# Patient Record
Sex: FEMALE | Race: OTHER
[De-identification: names, ages, dates, MRNs, and addresses within clinical notes are randomized per-mention and may not be internally consistent; named-entity substitution may affect disease eponyms.]

---

## 2019-06-08 ENCOUNTER — HOSPITAL ENCOUNTER (EMERGENCY)
Dept: HOSPITAL 54 - ER | Age: 54
Discharge: HOME | End: 2019-06-08
Payer: COMMERCIAL

## 2019-06-08 VITALS — HEIGHT: 63 IN | WEIGHT: 175 LBS | BODY MASS INDEX: 31.01 KG/M2

## 2019-06-08 VITALS — SYSTOLIC BLOOD PRESSURE: 130 MMHG | DIASTOLIC BLOOD PRESSURE: 75 MMHG

## 2019-06-08 DIAGNOSIS — I10: ICD-10-CM

## 2019-06-08 DIAGNOSIS — R10.13: Primary | ICD-10-CM

## 2019-06-08 DIAGNOSIS — Z90.49: ICD-10-CM

## 2019-06-08 DIAGNOSIS — R42: ICD-10-CM

## 2019-06-08 DIAGNOSIS — K80.20: ICD-10-CM

## 2019-06-08 LAB
ALBUMIN SERPL BCP-MCNC: 3.9 G/DL (ref 3.4–5)
ALP SERPL-CCNC: 56 U/L (ref 46–116)
ALT SERPL W P-5'-P-CCNC: 49 U/L (ref 12–78)
AST SERPL W P-5'-P-CCNC: 34 U/L (ref 15–37)
BASOPHILS # BLD AUTO: 0.1 /CMM (ref 0–0.2)
BASOPHILS NFR BLD AUTO: 0.6 % (ref 0–2)
BILIRUB DIRECT SERPL-MCNC: 0.1 MG/DL (ref 0–0.2)
BILIRUB SERPL-MCNC: 0.4 MG/DL (ref 0.2–1)
BUN SERPL-MCNC: 32 MG/DL (ref 7–18)
CALCIUM SERPL-MCNC: 9.1 MG/DL (ref 8.5–10.1)
CHLORIDE SERPL-SCNC: 104 MMOL/L (ref 98–107)
CO2 SERPL-SCNC: 27 MMOL/L (ref 21–32)
CREAT SERPL-MCNC: 0.8 MG/DL (ref 0.6–1.3)
EOSINOPHIL NFR BLD AUTO: 0.3 % (ref 0–6)
GLUCOSE SERPL-MCNC: 115 MG/DL (ref 74–106)
HCT VFR BLD AUTO: 44 % (ref 33–45)
HGB BLD-MCNC: 14.6 G/DL (ref 11.5–14.8)
LIPASE SERPL-CCNC: 221 U/L (ref 73–393)
LYMPHOCYTES NFR BLD AUTO: 1.9 /CMM (ref 0.8–4.8)
LYMPHOCYTES NFR BLD AUTO: 18.9 % (ref 20–44)
LYMPHOCYTES NFR BLD MANUAL: 20 % (ref 16–48)
MCHC RBC AUTO-ENTMCNC: 34 G/DL (ref 31–36)
MCV RBC AUTO: 90 FL (ref 82–100)
MONOCYTES NFR BLD AUTO: 0.5 /CMM (ref 0.1–1.3)
MONOCYTES NFR BLD AUTO: 5.1 % (ref 2–12)
MONOCYTES NFR BLD MANUAL: 6 % (ref 0–11)
NEUTROPHILS # BLD AUTO: 7.4 /CMM (ref 1.8–8.9)
NEUTROPHILS NFR BLD AUTO: 75.1 % (ref 43–81)
NEUTS SEG NFR BLD MANUAL: 74 % (ref 42–76)
PLATELET # BLD AUTO: 207 /CMM (ref 150–450)
POTASSIUM SERPL-SCNC: 3.6 MMOL/L (ref 3.5–5.1)
PROT SERPL-MCNC: 7.6 G/DL (ref 6.4–8.2)
RBC # BLD AUTO: 4.83 MIL/UL (ref 4–5.2)
SODIUM SERPL-SCNC: 141 MMOL/L (ref 136–145)
WBC NRBC COR # BLD AUTO: 9.8 K/UL (ref 4.3–11)

## 2019-06-08 PROCEDURE — 80048 BASIC METABOLIC PNL TOTAL CA: CPT

## 2019-06-08 PROCEDURE — 82962 GLUCOSE BLOOD TEST: CPT

## 2019-06-08 PROCEDURE — 83690 ASSAY OF LIPASE: CPT

## 2019-06-08 PROCEDURE — 71045 X-RAY EXAM CHEST 1 VIEW: CPT

## 2019-06-08 PROCEDURE — 85025 COMPLETE CBC W/AUTO DIFF WBC: CPT

## 2019-06-08 PROCEDURE — 96361 HYDRATE IV INFUSION ADD-ON: CPT

## 2019-06-08 PROCEDURE — 93005 ELECTROCARDIOGRAM TRACING: CPT

## 2019-06-08 PROCEDURE — 99284 EMERGENCY DEPT VISIT MOD MDM: CPT

## 2019-06-08 PROCEDURE — 76705 ECHO EXAM OF ABDOMEN: CPT

## 2019-06-08 PROCEDURE — 36415 COLL VENOUS BLD VENIPUNCTURE: CPT

## 2019-06-08 PROCEDURE — 84484 ASSAY OF TROPONIN QUANT: CPT

## 2019-06-08 PROCEDURE — 96374 THER/PROPH/DIAG INJ IV PUSH: CPT

## 2019-06-08 PROCEDURE — 80076 HEPATIC FUNCTION PANEL: CPT

## 2019-08-15 ENCOUNTER — HOSPITAL ENCOUNTER (OUTPATIENT)
Dept: HOSPITAL 91 - SDS | Age: 54
Discharge: HOME | End: 2019-08-15
Payer: COMMERCIAL

## 2019-08-15 ENCOUNTER — HOSPITAL ENCOUNTER (OUTPATIENT)
Dept: HOSPITAL 10 - SDS | Age: 54
Discharge: HOME | End: 2019-08-15
Attending: SURGERY
Payer: COMMERCIAL

## 2019-08-15 VITALS — HEART RATE: 56 BPM | RESPIRATION RATE: 16 BRPM | DIASTOLIC BLOOD PRESSURE: 74 MMHG | SYSTOLIC BLOOD PRESSURE: 131 MMHG

## 2019-08-15 VITALS — RESPIRATION RATE: 19 BRPM | DIASTOLIC BLOOD PRESSURE: 76 MMHG | SYSTOLIC BLOOD PRESSURE: 153 MMHG | HEART RATE: 80 BPM

## 2019-08-15 VITALS — HEART RATE: 62 BPM | RESPIRATION RATE: 16 BRPM | DIASTOLIC BLOOD PRESSURE: 75 MMHG | SYSTOLIC BLOOD PRESSURE: 135 MMHG

## 2019-08-15 VITALS — SYSTOLIC BLOOD PRESSURE: 140 MMHG | HEART RATE: 54 BPM | DIASTOLIC BLOOD PRESSURE: 76 MMHG | RESPIRATION RATE: 15 BRPM

## 2019-08-15 VITALS
BODY MASS INDEX: 28.71 KG/M2 | HEIGHT: 63 IN | WEIGHT: 162.04 LBS | WEIGHT: 162.04 LBS | HEIGHT: 63 IN | BODY MASS INDEX: 28.71 KG/M2

## 2019-08-15 VITALS — RESPIRATION RATE: 13 BRPM | DIASTOLIC BLOOD PRESSURE: 74 MMHG | SYSTOLIC BLOOD PRESSURE: 143 MMHG | HEART RATE: 58 BPM

## 2019-08-15 VITALS — SYSTOLIC BLOOD PRESSURE: 144 MMHG | DIASTOLIC BLOOD PRESSURE: 74 MMHG | RESPIRATION RATE: 15 BRPM | HEART RATE: 60 BPM

## 2019-08-15 VITALS — SYSTOLIC BLOOD PRESSURE: 163 MMHG | DIASTOLIC BLOOD PRESSURE: 67 MMHG | HEART RATE: 80 BPM | RESPIRATION RATE: 19 BRPM

## 2019-08-15 VITALS — RESPIRATION RATE: 13 BRPM | SYSTOLIC BLOOD PRESSURE: 139 MMHG | DIASTOLIC BLOOD PRESSURE: 74 MMHG | HEART RATE: 50 BPM

## 2019-08-15 VITALS — RESPIRATION RATE: 13 BRPM | HEART RATE: 53 BPM | SYSTOLIC BLOOD PRESSURE: 134 MMHG | DIASTOLIC BLOOD PRESSURE: 73 MMHG

## 2019-08-15 VITALS — RESPIRATION RATE: 13 BRPM | DIASTOLIC BLOOD PRESSURE: 71 MMHG | SYSTOLIC BLOOD PRESSURE: 132 MMHG | HEART RATE: 53 BPM

## 2019-08-15 VITALS — SYSTOLIC BLOOD PRESSURE: 140 MMHG | DIASTOLIC BLOOD PRESSURE: 78 MMHG | RESPIRATION RATE: 12 BRPM | HEART RATE: 56 BPM

## 2019-08-15 VITALS — HEART RATE: 68 BPM | RESPIRATION RATE: 19 BRPM | SYSTOLIC BLOOD PRESSURE: 148 MMHG | DIASTOLIC BLOOD PRESSURE: 77 MMHG

## 2019-08-15 VITALS — HEART RATE: 54 BPM | DIASTOLIC BLOOD PRESSURE: 80 MMHG | SYSTOLIC BLOOD PRESSURE: 142 MMHG | RESPIRATION RATE: 11 BRPM

## 2019-08-15 VITALS — RESPIRATION RATE: 13 BRPM | SYSTOLIC BLOOD PRESSURE: 131 MMHG | HEART RATE: 50 BPM | DIASTOLIC BLOOD PRESSURE: 69 MMHG

## 2019-08-15 DIAGNOSIS — K80.10: Primary | ICD-10-CM

## 2019-08-15 DIAGNOSIS — I10: ICD-10-CM

## 2019-08-15 PROCEDURE — 88304 TISSUE EXAM BY PATHOLOGIST: CPT

## 2019-08-15 PROCEDURE — 84703 CHORIONIC GONADOTROPIN ASSAY: CPT

## 2019-08-15 PROCEDURE — 47562 LAPAROSCOPIC CHOLECYSTECTOMY: CPT

## 2019-08-15 RX ADMIN — OXYCODONE HYDROCHLORIDE AND ACETAMINOPHEN 1 TAB: 5; 325 TABLET ORAL at 10:40

## 2019-08-15 RX ADMIN — HYDROMORPHONE HYDROCHLORIDE 1 MG: 2 INJECTION INTRAMUSCULAR; INTRAVENOUS; SUBCUTANEOUS at 10:36

## 2019-08-15 RX ADMIN — BUPIVACAINE HYDROCHLORIDE 1 ML: 2.5 INJECTION, SOLUTION EPIDURAL; INFILTRATION; INTRACAUDAL; PERINEURAL at 09:27

## 2019-08-15 RX ADMIN — OXYCODONE HYDROCHLORIDE AND ACETAMINOPHEN 1 TAB: 5; 325 TABLET ORAL at 11:18

## 2019-08-15 NOTE — HPN
Date/Time of Note


Date/Time of Note


DATE: 8/15/19 


TIME: 06:56





Interval H&P Admission Note


Pt. seen H&P reviewed:  No system changes











SYDNEE TORRES MD             Aug 15, 2019 06:56

## 2019-08-15 NOTE — OPR
Date/Time of Note


Date/Time of Note


DATE: 8/15/19 


TIME: 09:51





Operative Report


Procedure Date:  Aug 15, 2019


Preoperative Diagnosis


Gallstones without obstruction


Postoperative Diagnosis


Gallstones without obstruction


Operation/Procedure Performed


Laparoscopic cholecystectomy


Surgeon


Sydnee Torres MD


Assistant


None


Anesthesia Type:  general


Anesthesiologist:  KIANA JIMENEZ MD


Estimated Blood Loss:  minimal


Transfusion


   none


Specimen


Gallbladder


Grafts/Implants


none


Tubes/Drains


None


Complications


none


Pt Condition Post Procedure:  stable


Disposition:  PACU


Indications


Symptomatic cholelithiasis


Procedure Description


After satisfactory general endotracheal anesthesia was achieved, the abdomen was


prepped and draped in the usual fashion.  The abdomen was insufflated with 


carbon dioxide through an umbilical Veress needle to 15 mmHg pressure.  The 


Veress needle was removed and the umbilical incision extended to 5 mm through 


which a 5 mm trocar was placed.  A 5 mm 0 degree lens was placed.  Laparoscopy 


showed a distended gallbladder and was otherwise unremarkable.  Under direct 


visualization a 12 mm epigastric trocar was placed as well as 2 more 5 mm right 


lateral abdominal trochars.  The dome of the gallbladder was then grasped and 


retracted superiorly.  Martinez's pouch was grasped and retracted 


inferolaterally.  The hepatoduodenal ligament was carefully dissected between 


the gallbladder and the well-visualized mouna hepatis.  In this case the cystic 


artery was coursing anteriorly this was divided over a clip.  The cystic duct 


was then dissected circumferentially and then triply hemoclipped and divided 


high at the junction of the gallbladder and the cystic duct.  The peritoneal 


attachments of the gallbladder was divided over clip.  The gallbladder was then 


dissected from below using electrocautery dissection and placed fully intact 


into an Endo Catch removed via the epigastric port.  Hemostasis was total and 


irrigant returned clear.  The abdomen was then desufflated and all trochars were


removed.  The fascia of the epigastrium was closed with 2 sutures of 0 Vicryl.  


The skin punctures were infiltrated with 30 cc of 0.25% Marcaine plain and 


closed with staples.  Sponge and needle counts were reported as correct x2.











SYDNEE TORRES MD             Aug 15, 2019 09:56

## 2019-08-15 NOTE — PREAC
Date/Time of Note


Date/Time of Note


DATE: 8/15/19 


TIME: 08:54





Anesthesia Eval and Record


Evaluation


Time Pre-Procedure Interview


DATE: 8/15/19 


TIME: 08:54


Age


54


Sex


female


NPO:  8 hrs


Preoperative diagnosis


cholelithiasis


Planned procedure


laparoscopic cholecystectomy





Past Medical History


Past Medical History:  Includes


Cardio:  HTN


Musculoskeletal:  Osteoarthritis





Surgery & Anesthesia Issues


No known issue





Meds


Anticoagulation:  No


Beta Blocker within 24 hr:  No


Reason Beta Blocker not given:  Pt. not on B-Blocker


Reported Medications


Nitrofurantoin Monohyd Macrocr* (Macrobid*) 100 Mg Capsr, 100 MG PO BID, CAP


   STARTED 8-12-19 FOR 5 DAYS


   8/15/19


Lisinopril/Hydrochlorothiazide (Lisinopril-Hctz 20-12.5 mg Tab) 1 Each Tablet, 2


EACH PO DAILY, TAB


   8/15/19


Gabapentin* (Gabapentin*) 100 Mg Capsule, 200 MG PO QHS, #180 CAP


   8/15/19


Meds reviewed:  Yes





Allergies


Coded Allergies:  


     No Known Allergy (Unverified , 8/15/19)


Allergies Reviewed:  Yes





Labs/Studies


Labs Reviewed:  Reviewed by anesthesiologist


Pregnancy test:  Negative


Studies:  ECG, CXR





Pre-procedure Exam


Last vitals





Vital Signs


  Date      Temp  Pulse  Resp  B/P (MAP)   Pulse Ox  O2          O2 Flow    FiO2


Time                                                 Delivery    Rate


   8/15/19  98.6     62    16      135/75        97  Room Air


     08:35                           (95)





Airway:  Adequate mouth opening, Adequate thyromental dist


Mallampati:  Mallampati II


Teeth:  Normal


Lung:  Normal


Heart:  Normal





ASA Physical Status


ASA physical status:  2


Emergency:  None





Planned Anesthetic


General/MAC:  ETT


Nerve block:  TAP (bilateral)





Planned Pain Management


Single shot nerve block, Parenteral pain med





Pre-operative Attestations


Prior to commencing anesthesia and surgery, the patient was re-evaluated, there 


was verification of:


*The patient's identity


*The results of appropriate recent lab work and preoperative vital signs


*The above evaluation not changing prior to induction


*Anesthetic plan, risk benefits, alternative and complications discussed with p


atient/family; questions answered; patient/family understands, accepts and 


wishes to proceed.


 used











KIANA JIMENEZ MD                Aug 15, 2019 08:55

## 2019-08-15 NOTE — PAC
Date/Time of Note


Date/Time of Note


DATE: 8/15/19 


TIME: 10:06





Post-Anesthesia Notes


Post-Anesthesia Note


Last documented vital signs





Vital Signs


  Date      Temp  Pulse  Resp  B/P (MAP)   Pulse Ox  O2          O2 Flow    FiO2


Time                                                 Delivery    Rate


   8/15/19  98.6     62    16      135/75        97  Room Air


     08:35                           (95)





Activity:  WNL


Respiratory function:  WNL


Cardiovascular function:  WNL


Mental status:  Baseline


Pain reasonably controlled:  Yes


Hydration appropriate:  Yes


Nausea/Vomiting absent:  Yes


Comments


BP: 140/76


HR: 92


RR: 15


T: 98


SaO2: 100%











KIANA JIMENEZ MD                Aug 15, 2019 10:06